# Patient Record
Sex: MALE | Race: BLACK OR AFRICAN AMERICAN | NOT HISPANIC OR LATINO | Employment: UNEMPLOYED | URBAN - METROPOLITAN AREA
[De-identification: names, ages, dates, MRNs, and addresses within clinical notes are randomized per-mention and may not be internally consistent; named-entity substitution may affect disease eponyms.]

---

## 2020-04-22 PROBLEM — I10 ESSENTIAL HYPERTENSION: Status: ACTIVE | Noted: 2020-04-22

## 2020-05-13 PROBLEM — N13.4 HYDROURETER, LEFT: Status: ACTIVE | Noted: 2020-05-13

## 2020-05-26 ENCOUNTER — NURSE TRIAGE (OUTPATIENT)
Dept: ADMINISTRATIVE | Facility: CLINIC | Age: 55
End: 2020-05-26

## 2020-05-26 NOTE — TELEPHONE ENCOUNTER
Called patient on behalf of [facility]. No answer. Left message to let patient know someone will call back tomorrow.

## 2020-05-27 NOTE — TELEPHONE ENCOUNTER
Contacted for post procedural symptom tracker. Denies any cough,sob or fever.    Reason for Disposition   [1] Follow-up call to recent contact AND [2] information only call, no triage required    Additional Information   Negative: [1] Caller is not with the adult (patient) AND [2] reporting urgent symptoms   Negative: Lab result questions   Negative: Medication questions   Negative: Caller can't be reached by phone   Negative: Caller has already spoken to PCP or another triager   Negative: RN needs further essential information from caller in order to complete triage   Negative: Requesting regular office appointment   Negative: [1] Caller requesting NON-URGENT health information AND [2] PCP's office is the best resource   Negative: Health Information question, no triage required and triager able to answer question   Negative: General information question, no triage required and triager able to answer question   Negative: Question about upcoming scheduled test, no triage required and triager able to answer question   Negative: [1] Caller is not with the adult (patient) AND [2] probable NON-URGENT symptoms    Protocols used: INFORMATION ONLY CALL-A-

## 2020-12-14 DIAGNOSIS — R22.1 NECK MASS: Primary | ICD-10-CM

## 2020-12-15 ENCOUNTER — HOSPITAL ENCOUNTER (OUTPATIENT)
Dept: RADIOLOGY | Facility: HOSPITAL | Age: 55
Discharge: HOME | End: 2020-12-15
Attending: NURSE PRACTITIONER
Payer: MEDICAID

## 2020-12-15 DIAGNOSIS — R22.1 NECK MASS: ICD-10-CM

## 2020-12-15 PROCEDURE — 76536 US EXAM OF HEAD AND NECK: CPT | Mod: TC

## 2020-12-17 DIAGNOSIS — R22.1 LUMP IN NECK: Primary | ICD-10-CM

## 2021-03-11 ENCOUNTER — IMMUNIZATION (OUTPATIENT)
Dept: OBSTETRICS AND GYNECOLOGY | Facility: CLINIC | Age: 56
End: 2021-03-11
Payer: MEDICAID

## 2021-03-11 DIAGNOSIS — Z23 NEED FOR VACCINATION: Primary | ICD-10-CM

## 2021-03-11 PROCEDURE — 91300 COVID-19, MRNA, LNP-S, PF, 30 MCG/0.3 ML DOSE VACCINE: CPT | Mod: ,,, | Performed by: ANESTHESIOLOGY

## 2021-03-11 PROCEDURE — 91300 COVID-19, MRNA, LNP-S, PF, 30 MCG/0.3 ML DOSE VACCINE: ICD-10-PCS | Mod: ,,, | Performed by: ANESTHESIOLOGY

## 2021-03-11 PROCEDURE — 0001A COVID-19, MRNA, LNP-S, PF, 30 MCG/0.3 ML DOSE VACCINE: ICD-10-PCS | Mod: CV19,,, | Performed by: ANESTHESIOLOGY

## 2021-03-11 PROCEDURE — 0001A COVID-19, MRNA, LNP-S, PF, 30 MCG/0.3 ML DOSE VACCINE: CPT | Mod: CV19,,, | Performed by: ANESTHESIOLOGY

## 2021-04-01 ENCOUNTER — IMMUNIZATION (OUTPATIENT)
Dept: OBSTETRICS AND GYNECOLOGY | Facility: CLINIC | Age: 56
End: 2021-04-01

## 2021-04-01 DIAGNOSIS — Z23 NEED FOR VACCINATION: Primary | ICD-10-CM

## 2021-04-01 PROCEDURE — 0002A COVID-19, MRNA, LNP-S, PF, 30 MCG/0.3 ML DOSE VACCINE: CPT | Mod: CV19,,, | Performed by: ANESTHESIOLOGY

## 2021-04-01 PROCEDURE — 91300 COVID-19, MRNA, LNP-S, PF, 30 MCG/0.3 ML DOSE VACCINE: ICD-10-PCS | Mod: ,,, | Performed by: ANESTHESIOLOGY

## 2021-04-01 PROCEDURE — 91300 COVID-19, MRNA, LNP-S, PF, 30 MCG/0.3 ML DOSE VACCINE: CPT | Mod: ,,, | Performed by: ANESTHESIOLOGY

## 2021-04-01 PROCEDURE — 0002A COVID-19, MRNA, LNP-S, PF, 30 MCG/0.3 ML DOSE VACCINE: ICD-10-PCS | Mod: CV19,,, | Performed by: ANESTHESIOLOGY

## 2021-08-06 PROBLEM — H35.413 LATTICE DEGENERATION OF BOTH RETINAS: Status: ACTIVE | Noted: 2021-08-06

## 2021-08-06 PROBLEM — H33.321 RETINAL HOLE OF RIGHT EYE: Status: ACTIVE | Noted: 2021-08-06

## 2022-12-02 ENCOUNTER — HOSPITAL ENCOUNTER (EMERGENCY)
Facility: HOSPITAL | Age: 57
Discharge: HOME | End: 2022-12-02
Attending: EMERGENCY MEDICINE
Payer: MEDICAID

## 2022-12-02 VITALS
SYSTOLIC BLOOD PRESSURE: 127 MMHG | WEIGHT: 196 LBS | TEMPERATURE: 98 F | HEIGHT: 72 IN | OXYGEN SATURATION: 99 % | DIASTOLIC BLOOD PRESSURE: 74 MMHG | HEART RATE: 16 BPM | BODY MASS INDEX: 26.55 KG/M2 | RESPIRATION RATE: 18 BRPM
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
## 2022-12-02 DIAGNOSIS — S80.11XA CONTUSION OF RIGHT LOWER LEG, INITIAL ENCOUNTER: ICD-10-CM

## 2022-12-02 DIAGNOSIS — S20.212A CONTUSION OF LEFT CHEST WALL, INITIAL ENCOUNTER: ICD-10-CM

## 2022-12-02 PROCEDURE — 99284 EMERGENCY DEPT VISIT MOD MDM: CPT | Mod: 25

## 2022-12-02 PROCEDURE — 96375 TX/PRO/DX INJ NEW DRUG ADDON: CPT

## 2022-12-02 PROCEDURE — 63600175 PHARM REV CODE 636 W HCPCS: Performed by: EMERGENCY MEDICINE

## 2022-12-02 PROCEDURE — 96374 THER/PROPH/DIAG INJ IV PUSH: CPT

## 2022-12-02 RX ORDER — ONDANSETRON 2 MG/ML
8 INJECTION INTRAMUSCULAR; INTRAVENOUS
Status: COMPLETED | OUTPATIENT
Start: 2022-12-02 | End: 2022-12-02

## 2022-12-02 RX ORDER — HYDROCODONE BITARTRATE AND ACETAMINOPHEN 5; 325 MG/1; MG/1
1 TABLET ORAL EVERY 8 HOURS PRN
Qty: 12 TABLET | Refills: 0 | Status: SHIPPED | OUTPATIENT
Start: 2022-12-02

## 2022-12-02 RX ORDER — HYDROMORPHONE HYDROCHLORIDE 1 MG/ML
1 INJECTION, SOLUTION INTRAMUSCULAR; INTRAVENOUS; SUBCUTANEOUS
Status: COMPLETED | OUTPATIENT
Start: 2022-12-02 | End: 2022-12-02

## 2022-12-02 RX ADMIN — ONDANSETRON 8 MG: 2 INJECTION INTRAMUSCULAR; INTRAVENOUS at 06:12

## 2022-12-02 RX ADMIN — HYDROMORPHONE HYDROCHLORIDE 1 MG: 1 INJECTION, SOLUTION INTRAMUSCULAR; INTRAVENOUS; SUBCUTANEOUS at 06:12

## 2022-12-03 NOTE — ED PROVIDER NOTES
"Encounter Date: 12/2/2022       History     Chief Complaint   Patient presents with    Fall     Pt to the ER states, "Fell off of a 6ft ladder" Landed on my right side. Abrasion noted to the right lower leg. Pain to the left side. Pain 10/10     57-year-old male fell roughly 6 ft from a ladder complaining of right lower leg pain, worse with ambulation, better with rest and elevation.  Denies loss of conscious, alert oriented x4, GCS is 15.  The only pain he has is left-sided chest wall.  No shortness of breath.  No chest pain.  No neck pain.  He is not ill appearing, pleasant and polite    Review of patient's allergies indicates:  No Known Allergies  Past Medical History:   Diagnosis Date    Abnormal CT scan     GERD (gastroesophageal reflux disease)     Hyperlipidemia     Hypertension     Kidney stones      Past Surgical History:   Procedure Laterality Date    COLONOSCOPY      2014    COLONOSCOPY N/A 1/28/2022    Procedure: COLONOSCOPY;  Surgeon: Judy Shaw MD;  Location: [facility];  Service: Endoscopy;  Laterality: N/A;    ESOPHAGOGASTRODUODENOSCOPY N/A 1/28/2022    Procedure: EGD (ESOPHAGOGASTRODUODENOSCOPY);  Surgeon: Judy Shaw MD;  Location: [facility];  Service: Endoscopy;  Laterality: N/A;    RETROGRADE PYELOGRAPHY Left 5/13/2020    Procedure: PYELOGRAM, RETROGRADE;  Surgeon: Kris Barrett II, MD;  Location: [facility];  Service: Urology;  Laterality: Left;    testicle removed      URETERAL STENT PLACEMENT Left 5/13/2020    Procedure: INSERTION, STENT, URETER;  Surgeon: Kris Barrett II, MD;  Location: [facility];  Service: Urology;  Laterality: Left;    URETEROSCOPY Left 5/13/2020    Procedure: URETEROSCOPY;  Surgeon: Kris Barrett II, MD;  Location: [facility];  Service: Urology;  Laterality: Left;     Family History   Problem Relation Age of Onset    Hypertension Mother     Hypertension Father     Heart attacks under age 50 Father      Social History     Tobacco Use    Smoking status: Never    " Smokeless tobacco: Never   Substance Use Topics    Alcohol use: Yes     Comment: social    Drug use: No     Review of Systems   Constitutional:  Negative for fever.   HENT:  Negative for sore throat.    Respiratory:  Negative for shortness of breath.    Cardiovascular:  Negative for chest pain.   Gastrointestinal:  Negative for nausea.   Genitourinary:  Negative for dysuria.   Musculoskeletal:  Negative for back pain.        Pain to right lower leg   Skin:  Negative for rash.   Neurological:  Negative for weakness.   Hematological:  Does not bruise/bleed easily.   All other systems reviewed and are negative.    Physical Exam     Initial Vitals [12/02/22 1807]   BP Pulse Resp Temp SpO2   (!) 145/102 102 18 98.1 °F (36.7 °C) 100 %      MAP       --         Physical Exam    Nursing note and vitals reviewed.  Constitutional: He appears well-developed and well-nourished. He is not diaphoretic. No distress.   HENT:   Head: Normocephalic and atraumatic.   Eyes: Conjunctivae and EOM are normal. Pupils are equal, round, and reactive to light. Right eye exhibits no discharge. Left eye exhibits no discharge. No scleral icterus.   Neck: Neck supple. No JVD present.   Nexus criteria negative, no midline tenderness   Normal range of motion.  Cardiovascular:  Normal rate, regular rhythm, normal heart sounds and intact distal pulses.           No murmur heard.  Pulmonary/Chest: Breath sounds normal. No stridor. No respiratory distress. He has no wheezes. He has no rhonchi. He has no rales. He exhibits no tenderness.   Abdominal: Abdomen is soft. Bowel sounds are normal. He exhibits no distension and no mass. There is no abdominal tenderness. There is no rebound and no guarding.   Musculoskeletal:         General: No tenderness or edema. Normal range of motion.      Cervical back: Normal range of motion and neck supple.      Comments: Tenderness and swelling to lower right leg, neurovascularly intact, strong pulses, no distal  cyanosis.  Abrasion noted to anterior right lower leg as well     Neurological: He is alert and oriented to person, place, and time. He has normal strength. GCS score is 15. GCS eye subscore is 4. GCS verbal subscore is 5. GCS motor subscore is 6.   Skin: Skin is warm and dry. Capillary refill takes less than 2 seconds.       ED Course   Procedures  Labs Reviewed - No data to display       Imaging Results              X-Ray Tibia Fibula 2 View Right (In process)                      X-Ray Cervical Spine AP And Lateral (In process)                      X-Ray Pelvis Routine AP (In process)                      X-Ray Chest 1 View (In process)                      Medications   HYDROmorphone injection 1 mg (1 mg Intravenous Given 12/2/22 1842)   ondansetron injection 8 mg (8 mg Intravenous Given 12/2/22 1841)     Medical Decision Making:   Differential Diagnosis:   Fall, tib-fib fracture           ED Course as of 12/02/22 1856   Fri Dec 02, 2022   1853 X-rays without acute changes noted.  Ambulatory without assistance.  Stable for discharge [SD]      ED Course User Index  [SD] Young Farnsworth MD                 Clinical Impression:   Final diagnoses:  [W19.XXXA] Fall  [W19.XXXA] Fall, initial encounter (Primary)  [S20.212A] Contusion of left chest wall, initial encounter  [S80.11XA] Contusion of right lower leg, initial encounter        ED Disposition Condition    Discharge Stable          ED Prescriptions       Medication Sig Dispense Start Date End Date Auth. Provider    HYDROcodone-acetaminophen (NORCO) 5-325 mg per tablet Take 1 tablet by mouth every 8 (eight) hours as needed for Pain. 12 tablet 12/2/2022 -- Young Farnsworth MD          Follow-up Information       Follow up With Specialties Details Why Contact Info Additional Information    Primary care physician  In 2 days       [place] - Emergency Department Emergency Medicine  If symptoms worsen [facility] [facility]  [id number]  [phone] Floor 1             Young Farnsworth MD  12/02/22 1854

## 2022-12-08 ENCOUNTER — HOSPITAL ENCOUNTER (OUTPATIENT)
Dept: RADIOLOGY | Facility: HOSPITAL | Age: 57
Discharge: HOME | End: 2022-12-08
Attending: NURSE PRACTITIONER
Payer: MEDICAID

## 2022-12-08 DIAGNOSIS — M79.604 RIGHT LEG PAIN: Primary | ICD-10-CM

## 2022-12-08 DIAGNOSIS — M79.604 RIGHT LEG PAIN: ICD-10-CM

## 2022-12-08 PROCEDURE — 93971 EXTREMITY STUDY: CPT | Mod: TC,RT

## 2022-12-14 ENCOUNTER — HOSPITAL ENCOUNTER (OUTPATIENT)
Dept: RADIOLOGY | Facility: HOSPITAL | Age: 57
Discharge: HOME | End: 2022-12-14
Attending: SURGERY
Payer: MEDICAID

## 2022-12-14 ENCOUNTER — OFFICE VISIT (OUTPATIENT)
Dept: WOUND CARE | Facility: HOSPITAL | Age: 57
End: 2022-12-14
Attending: SURGERY
Payer: MEDICAID

## 2022-12-14 DIAGNOSIS — R07.9 CHEST PAIN, UNSPECIFIED TYPE: Primary | ICD-10-CM
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
## 2022-12-14 DIAGNOSIS — R07.9 CHEST PAIN, UNSPECIFIED TYPE: ICD-10-CM

## 2022-12-14 DIAGNOSIS — S81.811A LACERATION OF RIGHT LOWER LEG, INITIAL ENCOUNTER: ICD-10-CM

## 2022-12-14 PROCEDURE — 99213 OFFICE O/P EST LOW 20 MIN: CPT | Mod: 25

## 2022-12-14 PROCEDURE — 11042 DBRDMT SUBQ TIS 1ST 20SQCM/<: CPT

## 2022-12-14 PROCEDURE — 71046 X-RAY EXAM CHEST 2 VIEWS: CPT | Mod: TC

## 2022-12-14 RX ORDER — LEVOFLOXACIN 750 MG/1
750 TABLET ORAL DAILY
Qty: 7 TABLET | Refills: 0 | Status: SHIPPED | OUTPATIENT
Start: 2022-12-14 | End: 2023-01-18

## 2022-12-14 RX ORDER — COLLAGENASE SANTYL 250 [ARB'U]/G
OINTMENT TOPICAL DAILY
Qty: 30 G | Refills: 2 | Status: SHIPPED | OUTPATIENT
Start: 2022-12-14 | End: 2023-01-11

## 2022-12-21 ENCOUNTER — OFFICE VISIT (OUTPATIENT)
Dept: WOUND CARE | Facility: HOSPITAL | Age: 57
End: 2022-12-21
Attending: SURGERY
Payer: MEDICAID

## 2022-12-21 VITALS
DIASTOLIC BLOOD PRESSURE: 67 MMHG | TEMPERATURE: 98 F | SYSTOLIC BLOOD PRESSURE: 125 MMHG | HEART RATE: 78 BPM | RESPIRATION RATE: 17 BRPM
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
## 2022-12-21 DIAGNOSIS — R07.9 CHEST PAIN, UNSPECIFIED TYPE: Primary | ICD-10-CM

## 2022-12-21 DIAGNOSIS — S81.811D LACERATION OF RIGHT LOWER LEG, SUBSEQUENT ENCOUNTER: ICD-10-CM

## 2022-12-21 PROCEDURE — 11042 DBRDMT SUBQ TIS 1ST 20SQCM/<: CPT

## 2022-12-21 RX ORDER — SODIUM HYPOCHLORITE 5 MG/ML
SOLUTION TOPICAL
Qty: 473 ML | Refills: 3 | Status: SHIPPED | OUTPATIENT
Start: 2022-12-21 | End: 2023-01-11

## 2022-12-28 ENCOUNTER — OFFICE VISIT (OUTPATIENT)
Dept: WOUND CARE | Facility: HOSPITAL | Age: 57
End: 2022-12-28
Attending: SURGERY
Payer: MEDICAID

## 2022-12-28 VITALS
SYSTOLIC BLOOD PRESSURE: 126 MMHG | DIASTOLIC BLOOD PRESSURE: 71 MMHG | RESPIRATION RATE: 17 BRPM | TEMPERATURE: 98 F | HEART RATE: 76 BPM

## 2022-12-28 DIAGNOSIS — S81.811D LACERATION OF RIGHT LOWER LEG, SUBSEQUENT ENCOUNTER: ICD-10-CM
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
## 2022-12-28 DIAGNOSIS — R07.9 CHEST PAIN, UNSPECIFIED TYPE: Primary | ICD-10-CM

## 2022-12-28 PROCEDURE — 11042 DBRDMT SUBQ TIS 1ST 20SQCM/<: CPT

## 2023-01-04 ENCOUNTER — OFFICE VISIT (OUTPATIENT)
Dept: WOUND CARE | Facility: HOSPITAL | Age: 58
End: 2023-01-04
Attending: SURGERY
Payer: MEDICAID

## 2023-01-04 VITALS
HEART RATE: 75 BPM | RESPIRATION RATE: 17 BRPM | SYSTOLIC BLOOD PRESSURE: 123 MMHG | TEMPERATURE: 98 F | DIASTOLIC BLOOD PRESSURE: 68 MMHG

## 2023-01-04 DIAGNOSIS — S81.811D LACERATION OF RIGHT LOWER LEG, SUBSEQUENT ENCOUNTER: Primary | ICD-10-CM
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
## 2023-01-04 PROCEDURE — 11042 DBRDMT SUBQ TIS 1ST 20SQCM/<: CPT

## 2023-01-11 ENCOUNTER — OFFICE VISIT (OUTPATIENT)
Dept: WOUND CARE | Facility: HOSPITAL | Age: 58
End: 2023-01-11
Attending: SURGERY
Payer: MEDICAID

## 2023-01-11 DIAGNOSIS — S81.811A LACERATION OF RIGHT LOWER LEG, INITIAL ENCOUNTER: ICD-10-CM
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
## 2023-01-11 DIAGNOSIS — R07.9 CHEST PAIN, UNSPECIFIED TYPE: ICD-10-CM

## 2023-01-11 DIAGNOSIS — S81.811D LACERATION OF RIGHT LOWER LEG, SUBSEQUENT ENCOUNTER: Primary | ICD-10-CM

## 2023-01-11 PROCEDURE — 11042 DBRDMT SUBQ TIS 1ST 20SQCM/<: CPT

## 2023-01-11 RX ORDER — SODIUM HYPOCHLORITE 2.5 MG/ML
SOLUTION TOPICAL ONCE
Qty: 473 ML | Refills: 3 | Status: SHIPPED | OUTPATIENT
Start: 2023-01-11 | End: 2023-01-11

## 2023-01-18 ENCOUNTER — OFFICE VISIT (OUTPATIENT)
Dept: WOUND CARE | Facility: HOSPITAL | Age: 58
End: 2023-01-18
Attending: SURGERY
Payer: MEDICAID

## 2023-01-18 VITALS
SYSTOLIC BLOOD PRESSURE: 126 MMHG | DIASTOLIC BLOOD PRESSURE: 63 MMHG | RESPIRATION RATE: 17 BRPM | TEMPERATURE: 98 F | HEART RATE: 72 BPM
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
## 2023-01-18 DIAGNOSIS — L97.812 NON-PRESSURE CHRONIC ULCER OF OTHER PART OF RIGHT LOWER LEG WITH FAT LAYER EXPOSED: ICD-10-CM

## 2023-01-18 DIAGNOSIS — S81.811D LACERATION OF RIGHT LOWER LEG, SUBSEQUENT ENCOUNTER: Primary | ICD-10-CM

## 2023-01-18 PROBLEM — S81.811A LACERATION OF RIGHT LOWER LEG: Status: ACTIVE | Noted: 2023-01-18
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
## 2023-01-18 PROBLEM — R07.9 CHEST PAIN: Status: ACTIVE | Noted: 2023-01-18

## 2023-01-18 PROCEDURE — 11042 DBRDMT SUBQ TIS 1ST 20SQCM/<: CPT

## 2023-01-18 NOTE — PROGRESS NOTES
[facility]  Wound Care  History and Physical    Problem List Items Addressed This Visit          Cardiac/Vascular    Chest pain - Primary    Overview     S/p fall from ladder on 12/2.  Seen in ER and treated for wound on R leg and chest pain.  CXR revealed no acute abnormalities.  Pain is improved but pt was not convinced that he didn't break a rib.  CXR reviewed at area of pain and no fractures noted.  Will repeat today to re-evaluate ribs and delayed fluid collection         Relevant Orders    X-Ray Chest PA And Lateral (Completed)       Orthopedic    Accidental fall from ladder    Overview     S/p fall with leg and chest injury.  Had workup in ER that revealed no fractures.         Relevant Orders    X-Ray Chest PA And Lateral (Completed)    Laceration of right lower leg    Overview     Laceration as a result of fall.  Thought it was just a minor scrape but leg continued to swell.  US ordered by PCP revealed no DVT.  Palpable DP pulse.  Concerned about infection.  Exam reveals eschar to right anterior leg with 2-3+ edema and small rim of surrounding erythema.  Debrided with a curette to removed non viable skin and subcutaneous tissue.  Measured 7.5x0.4x0.2cm.  Underlying fat looks bruised but not frankly necrotic.  Packed with vashe.  Santyl ordered.  Daily dressing changes. Oral antibiotics for cellulitis.  FU next week.                History:    Past Medical History:   Diagnosis Date    Abnormal CT scan     GERD (gastroesophageal reflux disease)     Hyperlipidemia     Hypertension     Kidney stones        Past Surgical History:   Procedure Laterality Date    COLONOSCOPY      2014    COLONOSCOPY N/A 1/28/2022    Procedure: COLONOSCOPY;  Surgeon: Judy Shaw MD;  Location: [facility];  Service: Endoscopy;  Laterality: N/A;    ESOPHAGOGASTRODUODENOSCOPY N/A 1/28/2022    Procedure: EGD (ESOPHAGOGASTRODUODENOSCOPY);  Surgeon: Judy Shaw MD;  Location: [facility];  Service: Endoscopy;   Laterality: N/A;    RETROGRADE PYELOGRAPHY Left 5/13/2020    Procedure: PYELOGRAM, RETROGRADE;  Surgeon: Kris Barrett II, MD;  Location: [facility];  Service: Urology;  Laterality: Left;    testicle removed      URETERAL STENT PLACEMENT Left 5/13/2020    Procedure: INSERTION, STENT, URETER;  Surgeon: Kris Barrett II, MD;  Location: [facility];  Service: Urology;  Laterality: Left;    URETEROSCOPY Left 5/13/2020    Procedure: URETEROSCOPY;  Surgeon: Kris Barrett II, MD;  Location: [facility];  Service: Urology;  Laterality: Left;       Family History   Problem Relation Age of Onset    Hypertension Mother     Hypertension Father     Heart attacks under age 50 Father         reports that he has never smoked. He has never used smokeless tobacco. He reports current alcohol use. He reports that he does not use drugs.    has a current medication list which includes the following prescription(s): alfuzosin, amlodipine, finasteride, hydrochlorothiazide, hydrocodone-acetaminophen, latanoprost, levofloxacin, meloxicam, omeprazole, pantoprazole, pravastatin, and trandolapril.    Allergies:  Patient has no known allergies.    Review of Systems:  Review of Systems   Constitutional:  Negative for chills, diaphoresis, fever, malaise/fatigue and weight loss.   Respiratory:  Positive for shortness of breath.    Cardiovascular:  Positive for chest pain and leg swelling.   Gastrointestinal:  Negative for nausea and vomiting.   Musculoskeletal:  Positive for myalgias.   Skin:  Negative for itching and rash.   Neurological:  Negative for dizziness, sensory change, focal weakness, seizures, loss of consciousness, weakness and headaches.   Psychiatric/Behavioral:  Negative for memory loss.        There were no vitals filed for this visit.      BMI:  There is no height or weight on file to calculate BMI.    Physical Exam:  Physical Exam  Vitals and nursing note reviewed.   Constitutional:       General: He is not in acute distress.      Appearance: Normal appearance.   HENT:      Head: Normocephalic and atraumatic.      Nose: Nose normal.   Eyes:      Extraocular Movements: Extraocular movements intact.      Conjunctiva/sclera: Conjunctivae normal.      Pupils: Pupils are equal, round, and reactive to light.   Cardiovascular:      Rate and Rhythm: Normal rate and regular rhythm.      Heart sounds: Normal heart sounds. No murmur heard.    No gallop.   Pulmonary:      Effort: No respiratory distress.      Breath sounds: Normal breath sounds. No wheezing, rhonchi or rales.   Abdominal:      General: Bowel sounds are normal. There is no distension.      Palpations: Abdomen is soft.      Tenderness: There is no abdominal tenderness.   Musculoskeletal:         General: Swelling and signs of injury present. Normal range of motion.      Cervical back: Normal range of motion and neck supple.   Skin:     Comments: See wound docs   Neurological:      Mental Status: He is alert and oriented to person, place, and time. Mental status is at baseline.      Motor: No weakness.   Psychiatric:         Mood and Affect: Mood normal.         Behavior: Behavior normal.         Thought Content: Thought content normal.       A1C:  No results for input(s): HGBA1C in the last 2160 hours.  BMP:  No results for input(s): GLU, NA, K, CL, CO2, BUN, CREATININE, CALCIUM, MG in the last 2160 hours.   CBC:  No results for input(s): WBC, RBC, HGB, HCT, PLT, MCV, MCH, MCHC in the last 2160 hours.  CMP:  No results for input(s): GLU, CALCIUM, ALBUMIN, PROT, NA, K, CO2, CL, BUN, ALKPHOS, ALT, AST, BILITOT in the last 2160 hours.    Invalid input(s): CREATININ  PREALBUMIN:  No results for input(s): PREALBUMIN in the last 2160 hours.  WOUND CULTURES:  No results for input(s): LABAERO in the last 2160 hours.        Plan:  See Wound Docs note for plan and follow up.        Monica Hood  [facility]

## 2023-01-23 NOTE — PROGRESS NOTES
[facility]  Wound Care  Progress Note        Problem List Items Addressed This Visit          Orthopedic    Accidental fall from ladder    Overview     S/p fall with leg and chest injury.  Had workup in ER that revealed no fractures.         Laceration of right lower leg - Primary    Overview     Laceration as a result of fall.  Thought it was just a minor scrape but leg continued to swell.  US ordered by PCP revealed no DVT.  Palpable DP pulse.  Concerned about infection.  Exam reveals eschar to right anterior leg with edema and small rim of surrounding erythema on initial visit.  Now reveals decreased erythema with antibiotics.  Pt was unable to get Santyl due to cost so still using antibiotic ointment.  Debrided with a curette to remove slough.  Measured 6x1.5x1.6cm.  Fascia over tibia exposed but intact and viable with some granulation.      Packed with vashe.  Dakins since Santyl unobtainable.  Daily dressing changes. Finished oral antibiotics for cellulitis and erythema resolved.  FU next week.          See wound doc progress notes. Documents will be scanned.        Monica Hood  [facility]

## 2023-01-23 NOTE — PROGRESS NOTES
[facility]  Wound Care  Progress Note        Problem List Items Addressed This Visit          Cardiac/Vascular    Chest pain - Primary    Overview     S/p fall from ladder on 12/2.  Seen in ER and treated for wound on R leg and chest pain.  CXR revealed no acute abnormalities.  Pain is improved but pt was not convinced that he didn't break a rib.  CXR reviewed at area of pain and no fractures noted on repeat.              Orthopedic    Accidental fall from ladder    Overview     S/p fall with leg and chest injury.  Had workup in ER that revealed no fractures.         Laceration of right lower leg    Overview     Laceration as a result of fall.  Thought it was just a minor scrape but leg continued to swell.  US ordered by PCP revealed no DVT.  Palpable DP pulse.  Concerned about infection.  Exam reveals eschar to right anterior leg with edema and small rim of surrounding erythema on initial visit.  Now reveals decreased erythema with antibiotics.  Pt was unable to get Santyl due to cost so still using antibiotic ointment.  Debrided with a curette to removed non viable skin and subcutaneous tissue revealing clot adherent to the pretibial fascia.  Measured 7.5x0.8x0.7cm after debridement as depth was more than anticipated.    Packed with vashe.  Dakins since Santyl unobtainable.  Daily dressing changes. Finish oral antibiotics for cellulitis.  FU next week.          See wound doc progress notes. Documents will be scanned.        Monica Hood  [facility]

## 2023-01-23 NOTE — PROGRESS NOTES
[facility]  Wound Care  Progress Note        Problem List Items Addressed This Visit          Cardiac/Vascular    Chest pain - Primary    Overview     S/p fall from ladder on 12/2.  Seen in ER and treated for wound on R leg and chest pain.  CXR revealed no acute abnormalities.  Pain is improved but pt was not convinced that he didn't break a rib.  CXR reviewed at area of pain and no fractures noted on repeat.  Continues to improve.            Orthopedic    Accidental fall from ladder    Overview     S/p fall with leg and chest injury.  Had workup in ER that revealed no fractures.         Laceration of right lower leg    Overview     Laceration as a result of fall.  Thought it was just a minor scrape but leg continued to swell.  US ordered by PCP revealed no DVT.  Palpable DP pulse.  Concerned about infection.  Exam reveals eschar to right anterior leg with edema and small rim of surrounding erythema on initial visit.  Now reveals decreased erythema with antibiotics.  Pt was unable to get Santyl due to cost so still using antibiotic ointment.  Debrided with a curette to removed non viable skin and subcutaneous tissue revealing clot adherent to the pretibial fascia.  Measured 6.3x1.5x1.5cm.  Fascia over tibia exposed but intact and viable.      Packed with vashe.  Dakins since Santyl unobtainable.  Daily dressing changes. Finished oral antibiotics for cellulitis and erythema resolved.  FU next week.          See wound doc progress notes. Documents will be scanned.        Monica Hood  [facility]

## 2023-01-25 ENCOUNTER — OFFICE VISIT (OUTPATIENT)
Dept: WOUND CARE | Facility: HOSPITAL | Age: 58
End: 2023-01-25
Attending: SURGERY
Payer: MEDICAID

## 2023-01-25 VITALS
RESPIRATION RATE: 17 BRPM | DIASTOLIC BLOOD PRESSURE: 69 MMHG | TEMPERATURE: 98 F | SYSTOLIC BLOOD PRESSURE: 127 MMHG | HEART RATE: 65 BPM
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
## 2023-01-25 DIAGNOSIS — S81.811D LACERATION OF RIGHT LOWER LEG, SUBSEQUENT ENCOUNTER: ICD-10-CM

## 2023-01-25 DIAGNOSIS — L97.812 NON-PRESSURE CHRONIC ULCER OF OTHER PART OF RIGHT LOWER LEG WITH FAT LAYER EXPOSED: Primary | ICD-10-CM

## 2023-01-25 PROCEDURE — 11042 DBRDMT SUBQ TIS 1ST 20SQCM/<: CPT

## 2023-02-01 ENCOUNTER — OFFICE VISIT (OUTPATIENT)
Dept: WOUND CARE | Facility: HOSPITAL | Age: 58
End: 2023-02-01
Attending: SURGERY
Payer: MEDICAID

## 2023-02-01 VITALS
TEMPERATURE: 98 F | HEART RATE: 68 BPM | DIASTOLIC BLOOD PRESSURE: 67 MMHG | RESPIRATION RATE: 17 BRPM | SYSTOLIC BLOOD PRESSURE: 125 MMHG

## 2023-02-01 DIAGNOSIS — S81.811A LACERATION OF RIGHT LOWER LEG, INITIAL ENCOUNTER: ICD-10-CM

## 2023-02-01 DIAGNOSIS — L97.812 NON-PRESSURE CHRONIC ULCER OF OTHER PART OF RIGHT LOWER LEG WITH FAT LAYER EXPOSED: Primary | ICD-10-CM
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
## 2023-02-01 PROCEDURE — 11042 DBRDMT SUBQ TIS 1ST 20SQCM/<: CPT

## 2023-02-08 PROBLEM — L97.812 NON-PRESSURE CHRONIC ULCER OF OTHER PART OF RIGHT LOWER LEG WITH FAT LAYER EXPOSED: Status: ACTIVE | Noted: 2023-02-08

## 2023-02-08 NOTE — PROGRESS NOTES
[facility]  Wound Care  Progress Note        Problem List Items Addressed This Visit          Cardiac/Vascular    Chest pain    Overview     S/p fall from ladder on 12/2.  Seen in ER and treated for wound on R leg and chest pain.  CXR revealed no acute abnormalities.  Pain is improved but pt was not convinced that he didn't break a rib.  CXR reviewed at area of pain and no fractures noted on repeat.  Continues to improve.            Orthopedic    Accidental fall from ladder    Overview     S/p fall with leg and chest injury.  Had workup in ER that revealed no fractures.         Laceration of right lower leg - Primary    Overview     Laceration as a result of fall.  Thought it was just a minor scrape but leg continued to swell.  US ordered by PCP revealed no DVT.  Palpable DP pulse.  Concerned about infection.  Exam reveals eschar to right anterior leg with edema and small rim of surrounding erythema on initial visit.  Now reveals decreased erythema with antibiotics.  Pt was unable to get Santyl due to cost so still using antibiotic ointment.  Debrided with a curette to remove slough.  Measured 4.7x1.3x1.4cm.  Fascia over tibia starting to granulate.   Packed with vashe.  Dakins since Santyl unobtainable.  Daily dressing changes. Finished oral antibiotics for cellulitis and erythema resolved.  FU next week.          See wound doc progress notes. Documents will be scanned.        Monica Hood  [facility]

## 2023-02-15 ENCOUNTER — OFFICE VISIT (OUTPATIENT)
Dept: WOUND CARE | Facility: HOSPITAL | Age: 58
End: 2023-02-15
Attending: SURGERY
Payer: MEDICAID

## 2023-02-15 VITALS
TEMPERATURE: 98 F | DIASTOLIC BLOOD PRESSURE: 70 MMHG | HEART RATE: 68 BPM | RESPIRATION RATE: 17 BRPM | SYSTOLIC BLOOD PRESSURE: 126 MMHG

## 2023-02-15 DIAGNOSIS — L97.812 NON-PRESSURE CHRONIC ULCER OF OTHER PART OF RIGHT LOWER LEG WITH FAT LAYER EXPOSED: Primary | ICD-10-CM

## 2023-02-15 DIAGNOSIS — S81.811D LACERATION OF RIGHT LOWER LEG, SUBSEQUENT ENCOUNTER: ICD-10-CM
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
## 2023-02-15 PROCEDURE — 11042 DBRDMT SUBQ TIS 1ST 20SQCM/<: CPT

## 2023-02-22 ENCOUNTER — OFFICE VISIT (OUTPATIENT)
Dept: WOUND CARE | Facility: HOSPITAL | Age: 58
End: 2023-02-22
Attending: SURGERY
Payer: MEDICAID

## 2023-02-22 VITALS
SYSTOLIC BLOOD PRESSURE: 123 MMHG | HEART RATE: 70 BPM | DIASTOLIC BLOOD PRESSURE: 68 MMHG | RESPIRATION RATE: 17 BRPM | TEMPERATURE: 98 F
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
## 2023-02-22 DIAGNOSIS — L97.812 NON-PRESSURE CHRONIC ULCER OF OTHER PART OF RIGHT LOWER LEG WITH FAT LAYER EXPOSED: Primary | ICD-10-CM

## 2023-02-22 DIAGNOSIS — S81.811A LACERATION OF RIGHT LOWER LEG, INITIAL ENCOUNTER: ICD-10-CM

## 2023-02-22 PROCEDURE — 11042 DBRDMT SUBQ TIS 1ST 20SQCM/<: CPT

## 2023-02-24 NOTE — PROGRESS NOTES
[facility]  Wound Care  Progress Note        Problem List Items Addressed This Visit          Orthopedic    Accidental fall from ladder    Overview     S/p fall with leg and chest injury.  Had workup in ER that revealed no fractures.         Laceration of right lower leg    Overview     Laceration as a result of fall.  Thought it was just a minor scrape but leg continued to swell.  US ordered by PCP revealed no DVT.  Palpable DP pulse.  Concerned about infection.  Exam revealed eschar to right anterior leg with edema and small rim of surrounding erythema on initial visit.  Now reveals resolved cellulitis.  Pt was unable to get Santyl due to cost so was using antibiotic ointment.  Currently reveals granulation at base.  Dressing with moistened vashe gauze.   Debrided with a curette to remove slough.  Measured 4.7x1.8x1.1cm.  Fascia over tibia has granulated well.   Packed with saline moistened gauze.  Daily dressing changes.  FU next week.           Non-pressure chronic ulcer of other part of right lower leg with fat layer exposed - Primary    Overview     See laceration description        See wound doc progress notes. Documents will be scanned.        Monica Hood  [facility]

## 2023-02-26 NOTE — PROGRESS NOTES
[facility]  Wound Care  Progress Note        Problem List Items Addressed This Visit          Orthopedic    Accidental fall from ladder    Overview     S/p fall with leg and chest injury.  Had workup in ER that revealed no fractures.         Laceration of right lower leg    Overview     Laceration as a result of fall.  Thought it was just a minor scrape but leg continued to swell.  US ordered by PCP revealed no DVT.  Palpable DP pulse.  Concerned about infection.  Exam revealed eschar to right anterior leg with edema and small rim of surrounding erythema on initial visit.  Few weeks ago had cellulitis.  Pt was unable to get Santyl due to cost so was using antibiotic ointment.  Currently reveals granulation at base.  Dressing with moistened vashe gauze.   Debrided with a curette to remove slough.  Measures 3.8x1.2x0.5cm.  Fascia over tibia has granulated well.   Packed with saline moistened gauze.  Daily dressing changes.  FU next week.           Non-pressure chronic ulcer of other part of right lower leg with fat layer exposed - Primary    Overview     See laceration description        See wound doc progress notes. Documents will be scanned.        Monica Hood  [facility]

## 2023-02-26 NOTE — PROGRESS NOTES
[facility]  Wound Care  Progress Note        Problem List Items Addressed This Visit          Orthopedic    Accidental fall from ladder    Overview     S/p fall with leg and chest injury.  Had workup in ER that revealed no fractures.         Laceration of right lower leg    Overview     Laceration as a result of fall.  Thought it was just a minor scrape but leg continued to swell.  US ordered by PCP revealed no DVT.  Palpable DP pulse.  Concerned about infection.  Exam revealed eschar to right anterior leg with edema and small rim of surrounding erythema on initial visit.  Few weeks ago had cellulitis.  Pt was unable to get Santyl due to cost so was using antibiotic ointment.  Currently reveals granulation at base.  Dressing with moistened vashe gauze.   Debrided with a curette to remove slough.  Measured 4.5x1.7x1.1cm.  Fascia over tibia has granulated well.   Packed with saline moistened gauze.  Daily dressing changes.  FU next week.           Non-pressure chronic ulcer of other part of right lower leg with fat layer exposed - Primary    Overview     See laceration description        See wound doc progress notes. Documents will be scanned.        Monica Hood  [facility]

## 2023-03-08 ENCOUNTER — OFFICE VISIT (OUTPATIENT)
Dept: WOUND CARE | Facility: HOSPITAL | Age: 58
End: 2023-03-08
Attending: SURGERY
Payer: MEDICAID

## 2023-03-08 VITALS
HEART RATE: 78 BPM | DIASTOLIC BLOOD PRESSURE: 78 MMHG | TEMPERATURE: 98 F | SYSTOLIC BLOOD PRESSURE: 128 MMHG | RESPIRATION RATE: 18 BRPM
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
## 2023-03-08 DIAGNOSIS — L97.812 NON-PRESSURE CHRONIC ULCER OF OTHER PART OF RIGHT LOWER LEG WITH FAT LAYER EXPOSED: Primary | ICD-10-CM

## 2023-03-08 DIAGNOSIS — S81.811A LACERATION OF RIGHT LOWER LEG, INITIAL ENCOUNTER: ICD-10-CM

## 2023-03-08 PROCEDURE — 11042 DBRDMT SUBQ TIS 1ST 20SQCM/<: CPT

## 2023-03-22 ENCOUNTER — OFFICE VISIT (OUTPATIENT)
Dept: WOUND CARE | Facility: HOSPITAL | Age: 58
End: 2023-03-22
Attending: SURGERY
Payer: MEDICAID

## 2023-03-22 VITALS
TEMPERATURE: 99 F | RESPIRATION RATE: 18 BRPM | HEART RATE: 81 BPM | DIASTOLIC BLOOD PRESSURE: 73 MMHG | SYSTOLIC BLOOD PRESSURE: 134 MMHG

## 2023-03-22 DIAGNOSIS — L97.812 NON-PRESSURE CHRONIC ULCER OF OTHER PART OF RIGHT LOWER LEG WITH FAT LAYER EXPOSED: ICD-10-CM

## 2023-03-22 DIAGNOSIS — S81.811A LACERATION OF RIGHT LOWER LEG, INITIAL ENCOUNTER: ICD-10-CM
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
## 2023-03-22 PROCEDURE — 99213 OFFICE O/P EST LOW 20 MIN: CPT

## 2023-04-05 ENCOUNTER — OFFICE VISIT (OUTPATIENT)
Dept: WOUND CARE | Facility: HOSPITAL | Age: 58
End: 2023-04-05
Attending: SURGERY
Payer: MEDICAID

## 2023-04-05 VITALS
SYSTOLIC BLOOD PRESSURE: 132 MMHG | TEMPERATURE: 98 F | RESPIRATION RATE: 18 BRPM | DIASTOLIC BLOOD PRESSURE: 74 MMHG | HEART RATE: 76 BPM

## 2023-04-05 DIAGNOSIS — S81.811D LACERATION OF RIGHT LOWER LEG, SUBSEQUENT ENCOUNTER: ICD-10-CM
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
## 2023-04-05 DIAGNOSIS — L97.812 NON-PRESSURE CHRONIC ULCER OF OTHER PART OF RIGHT LOWER LEG WITH FAT LAYER EXPOSED: Primary | ICD-10-CM

## 2023-04-05 PROCEDURE — 99212 OFFICE O/P EST SF 10 MIN: CPT

## 2023-04-17 NOTE — PROGRESS NOTES
[facility]  Wound Care  Progress Note        Problem List Items Addressed This Visit          Orthopedic    Accidental fall from ladder    Overview     S/p fall with leg and chest injury.  Had workup in ER that revealed no fractures.           Laceration of right lower leg    Overview     Laceration as a result of fall.  Thought it was just a minor scrape but leg continued to swell.  US ordered by PCP revealed no DVT.  Palpable DP pulse.  Concerned about infection.  Exam revealed eschar to right anterior leg with edema and small rim of surrounding erythema on initial visit.  Initially few weeks after presentation,  had cellulitis.  Pt was unable to get Santyl due to cost so was using antibiotic ointment.  Currently reveals granulation at base.  Dressing with moistened vashe gauze now.   Debrided with a curette to remove slough.  Measures 2.7x0.8x0.2cm.  Fascia over tibia has granulated well and wound continues to get smaller.   Dressed with silver alginate.  Every other day dressing changes.  FU 2 weeks.             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 Documents will be scanned.        Monica Hood  [facility]

## 2023-04-17 NOTE — PROGRESS NOTES
[facility]  Wound Care  Progress Note        Problem List Items Addressed This Visit          Orthopedic    Accidental fall from ladder    Overview     S/p fall with leg and chest injury.  Had workup in ER that revealed no fractures.           Laceration of right lower leg    Overview     Laceration as a result of fall.  Thought it was just a minor scrape but leg continued to swell.  US ordered by PCP revealed no DVT.  Palpable DP pulse.  Concerned about infection.  Exam revealed eschar to right anterior leg with edema and small rim of surrounding erythema on initial visit.  Initially few weeks after presentation,  had cellulitis.  Pt was unable to get Santyl due to cost so was using antibiotic ointment.  Currently reveals granulation at base.  Dressing with moistened vashe gauze now.   Debrided with a curette to remove slough.  Measures 3x0.9x0.3cm.  Fascia over tibia has granulated well and wound continues to get smaller.   Dressed with silver alginate.  Every other day dressing changes.  FU 2 weeks.             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 Documents will be scanned.        Monica Hood  [facility]

## 2023-05-10 NOTE — PROGRESS NOTES
[facility]  Wound Care  Progress Note        Problem List Items Addressed This Visit          Orthopedic    Accidental fall from ladder - Primary    Overview     S/p fall with leg and chest injury.  Had workup in ER that revealed no fractures.           Laceration of right lower leg    Overview     Laceration as a result of fall.  Thought it was just a minor scrape but leg continued to swell.  US ordered by PCP revealed no DVT.  Palpable DP pulse.  Concerned about infection.  Exam revealed eschar to right anterior leg with edema and small rim of surrounding erythema on initial visit.  Initially few weeks after presentation,  had cellulitis.  Pt was unable to get Santyl due to cost so was using antibiotic ointment.  Progressed rapidly with daily wound care.  Currently reveals healed wound.  No debridement necessary.  Dressed with dry dressing. FU next week for surveillance.             Non-pressure chronic ulcer of other part of right lower leg with fat layer exposed    Overview     See laceration description          See wound doc progress notes. Documents will be scanned.        Monica Hood  [facility]

## 2023-05-20 NOTE — PROGRESS NOTES
[facility]  Wound Care  Progress Note        Problem List Items Addressed This Visit          Orthopedic    Accidental fall from ladder    Overview     S/p fall with leg and chest injury.  Had workup in ER that revealed no fractures.           Laceration of right lower leg    Overview     Laceration as a result of fall.  Thought it was just a minor scrape but leg continued to swell.  US ordered by PCP revealed no DVT.  Palpable DP pulse.  Concerned about infection.  Exam revealed eschar to right anterior leg with edema and small rim of surrounding erythema on initial visit.  Initially few weeks after presentation,  had cellulitis.  Pt was unable to get Santyl due to cost so was using antibiotic ointment.  Progressed rapidly with daily wound care.  Currently reveals healed wound.  No debridement necessary.  Dressed with dry dressing. FU prn.             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 Documents will be scanned.        Monica Hood  [facility]